# Patient Record
Sex: MALE | Race: WHITE | NOT HISPANIC OR LATINO | Employment: UNEMPLOYED | URBAN - METROPOLITAN AREA
[De-identification: names, ages, dates, MRNs, and addresses within clinical notes are randomized per-mention and may not be internally consistent; named-entity substitution may affect disease eponyms.]

---

## 2021-05-06 ENCOUNTER — OFFICE VISIT (OUTPATIENT)
Dept: FAMILY MEDICINE CLINIC | Facility: CLINIC | Age: 31
End: 2021-05-06
Payer: COMMERCIAL

## 2021-05-06 VITALS
WEIGHT: 263 LBS | SYSTOLIC BLOOD PRESSURE: 122 MMHG | RESPIRATION RATE: 16 BRPM | BODY MASS INDEX: 34.85 KG/M2 | DIASTOLIC BLOOD PRESSURE: 90 MMHG | HEIGHT: 73 IN | OXYGEN SATURATION: 96 % | HEART RATE: 119 BPM | TEMPERATURE: 96.1 F

## 2021-05-06 DIAGNOSIS — M54.2 NECK PAIN: ICD-10-CM

## 2021-05-06 DIAGNOSIS — M54.32 SCIATICA OF LEFT SIDE: ICD-10-CM

## 2021-05-06 DIAGNOSIS — M54.42 CHRONIC LOW BACK PAIN WITH LEFT-SIDED SCIATICA, UNSPECIFIED BACK PAIN LATERALITY: ICD-10-CM

## 2021-05-06 DIAGNOSIS — R63.5 WEIGHT GAIN: ICD-10-CM

## 2021-05-06 DIAGNOSIS — Z00.00 HEALTH CARE MAINTENANCE: Primary | ICD-10-CM

## 2021-05-06 DIAGNOSIS — G89.29 CHRONIC LOW BACK PAIN WITH LEFT-SIDED SCIATICA, UNSPECIFIED BACK PAIN LATERALITY: ICD-10-CM

## 2021-05-06 DIAGNOSIS — E66.9 OBESITY (BMI 30-39.9): ICD-10-CM

## 2021-05-06 DIAGNOSIS — R61 NIGHT SWEATS: ICD-10-CM

## 2021-05-06 PROCEDURE — 3725F SCREEN DEPRESSION PERFORMED: CPT | Performed by: FAMILY MEDICINE

## 2021-05-06 PROCEDURE — 3008F BODY MASS INDEX DOCD: CPT | Performed by: FAMILY MEDICINE

## 2021-05-06 PROCEDURE — 99385 PREV VISIT NEW AGE 18-39: CPT | Performed by: FAMILY MEDICINE

## 2021-05-06 PROCEDURE — 1036F TOBACCO NON-USER: CPT | Performed by: FAMILY MEDICINE

## 2021-05-06 NOTE — PATIENT INSTRUCTIONS
Here for general PE and establishing care, update all labs and consult ENT for hx of neck pain and hx of possible infection  Rec consult with orthopedics low back pain and left sciatica  Lose weight as directed and also rec to avoid ETOH use to help lose weight  Get xrays of low back and also labs as directed

## 2021-05-06 NOTE — PROGRESS NOTES
Assessment/Plan:  Chief Complaint   Patient presents with   Nadia Wagner Establish Care     Pt has had neck pain in the past  Pt would also like to discuss numbness in left leg     Weight Gain    Back Pain    Night Sweats     Patient Instructions   Here for general PE and establishing care, update all labs and consult ENT for hx of neck pain and hx of possible infection  Rec consult with orthopedics low back pain and left sciatica  Lose weight as directed and also rec to avoid ETOH use to help lose weight  Get xrays of low back and also labs as directed  No problem-specific Assessment & Plan notes found for this encounter  Diagnoses and all orders for this visit:    Health care maintenance  -     Comprehensive metabolic panel; Future  -     CBC and differential; Future  -     Lipid Panel with Direct LDL reflex; Future  -     TSH, 3rd generation; Future  -     T4, free  -     Comprehensive metabolic panel  -     CBC and differential  -     Lipid Panel with Direct LDL reflex  -     TSH, 3rd generation    Neck pain  -     Ambulatory Referral to Otolaryngology; Future  -     XR spine cervical 2 or 3 vw injury; Future    Sciatica of left side  -     Ambulatory referral to Orthopedic Surgery; Future  -     XR spine lumbar 2 or 3 views injury; Future    Chronic low back pain with left-sided sciatica, unspecified back pain laterality  -     Ambulatory referral to Orthopedic Surgery; Future  -     XR spine lumbar 2 or 3 views injury; Future    Obesity (BMI 30-39 9)  -     Comprehensive metabolic panel; Future  -     Lipid Panel with Direct LDL reflex; Future  -     TSH, 3rd generation; Future  -     T4, free  -     Comprehensive metabolic panel  -     Lipid Panel with Direct LDL reflex  -     TSH, 3rd generation    Weight gain  -     Comprehensive metabolic panel; Future  -     CBC and differential; Future  -     TSH, 3rd generation;  Future  -     T4, free  -     Comprehensive metabolic panel  -     CBC and differential  -     TSH, 3rd generation    Night sweats  -     Comprehensive metabolic panel; Future  -     CBC and differential; Future  -     TSH, 3rd generation; Future  -     T4, free  -     Comprehensive metabolic panel  -     CBC and differential  -     TSH, 3rd generation          Subjective:      Patient ID: Esther Ann is a 26 y o  male  Establish Care (Pt has had neck pain in the past  Pt would also like to discuss numbness in left leg )  Weight Gain   Back Pain   Night Sweats     Needs general PE and went to urgent care 8/2019 for neck pain and had swollen glands and head pain in 2019 and got MRI done inconclusive  Prescribed abx as well  This took pain away and dropped weight as well  Never saw ENT and jaw was shifted forward  Possibly an infection in neck and labs were inconclusive and several f-up steps as well  2 weeks ago couldn't move neck without pain  It got better on its own  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  Non smoker  Patient is covid Pfizer vaccinated times 1  Has lower back pain, saw a back specialist and told to go to PT or surgery  Has night sweats  The following portions of the patient's history were reviewed and updated as appropriate: allergies, current medications, past family history, past medical history, past social history, past surgical history and problem list     Review of Systems   Constitutional: Negative  HENT: Negative  Eyes: Negative  Respiratory: Negative  Cardiovascular: Negative  Gastrointestinal: Negative  Endocrine: Negative  Genitourinary: Negative  Musculoskeletal: Positive for back pain (low back pain and sciatica left)  Neck pain better, mobility is better   Skin: Negative  Allergic/Immunologic: Negative  Neurological: Negative  Hematological: Negative  Psychiatric/Behavioral: Negative            Objective:      /90   Pulse (!) 119   Temp (!) 96 1 °F (35 6 °C) (Temporal) Resp 16   Ht 6' 0 84" (1 85 m)   Wt 119 kg (263 lb)   SpO2 96%   BMI 34 86 kg/m²          Physical Exam  Constitutional:       Appearance: He is well-developed  Comments: obesity   HENT:      Head: Normocephalic and atraumatic  Right Ear: External ear normal       Left Ear: External ear normal       Nose: Nose normal    Eyes:      Conjunctiva/sclera: Conjunctivae normal       Pupils: Pupils are equal, round, and reactive to light  Neck:      Musculoskeletal: Normal range of motion and neck supple  Cardiovascular:      Rate and Rhythm: Normal rate and regular rhythm  Heart sounds: Normal heart sounds  Pulmonary:      Effort: Pulmonary effort is normal       Breath sounds: Normal breath sounds  Abdominal:      General: Abdomen is flat  Bowel sounds are normal       Palpations: Abdomen is soft  Musculoskeletal: Normal range of motion  Skin:     General: Skin is warm and dry  Neurological:      Mental Status: He is alert and oriented to person, place, and time  Deep Tendon Reflexes: Reflexes are normal and symmetric     Psychiatric:         Behavior: Behavior normal

## 2023-02-22 ENCOUNTER — TELEPHONE (OUTPATIENT)
Dept: FAMILY MEDICINE CLINIC | Facility: CLINIC | Age: 33
End: 2023-02-22

## 2023-02-22 NOTE — TELEPHONE ENCOUNTER
I called and left the Pt a message to give the office a call back, he has not been seen since 2021  We would like to get him scheduled for a routine visit